# Patient Record
Sex: MALE | Race: WHITE | NOT HISPANIC OR LATINO | Employment: FULL TIME | URBAN - METROPOLITAN AREA
[De-identification: names, ages, dates, MRNs, and addresses within clinical notes are randomized per-mention and may not be internally consistent; named-entity substitution may affect disease eponyms.]

---

## 2019-10-14 ENCOUNTER — MEDICAL CORRESPONDENCE (OUTPATIENT)
Dept: HEALTH INFORMATION MANAGEMENT | Facility: CLINIC | Age: 29
End: 2019-10-14

## 2019-10-16 ENCOUNTER — PATIENT OUTREACH (OUTPATIENT)
Dept: PLASTIC SURGERY | Facility: CLINIC | Age: 29
End: 2019-10-16

## 2019-10-16 DIAGNOSIS — F64.0 GENDER DYSPHORIA IN ADOLESCENT AND ADULT: Primary | ICD-10-CM

## 2019-10-16 NOTE — PROGRESS NOTES
[facility]:  Care Coordination Note     SITUATION   Patient (Jude, He/Him)  is a 28 year old who is receiving support for:  Consult For (Top Surgery) and Clinic Care Coordination - Initial ([facility] pt)  .    BACKGROUND     New pt to [facility] requesting top surgery with Dr. Aviles.     Pt has letter of support from Dimitry Arias.     ASSESSMENT     Surgery              [facility] Assessment  [facility] ([facility]) Enrollment: Enrolled(hormones started in 3/2017 with Dr. Quijano at [facility])  Patient has a therapist: Yes  Name of therapist: Dimitry Arias  Letter of support #1: Requested  Surgery being considered: Yes  Mastectomy: Yes    Pt has [employer] [place] through work.  Pt looked up policy but couldn't understand it.  Pt instructed to bring policy to consultation to review with [facility] care coordinator.       PLAN          Nursing Interventions:   [facility] program and services discussed with patient. [facility] referral placed. [facility] assessment completed. Process for accessing surgery discussed including: WPATH standards of care, Letters of support, PA insurance process, surgery scheduling, and approximate timeline. Pt questions answered. Registration completed & reviewed; scheduling process discussed & completed, as necessary.     More than 50% of the time was used to educate patient on medical & surgical process.     Follow-up plan:  Pt scheduled for top consult with Stefan on 8/4/20 1pm.        Jairo Castellanos

## 2019-10-25 ENCOUNTER — PATIENT OUTREACH (OUTPATIENT)
Dept: PLASTIC SURGERY | Facility: CLINIC | Age: 29
End: 2019-10-25

## 2020-03-11 ENCOUNTER — HEALTH MAINTENANCE LETTER (OUTPATIENT)
Age: 30
End: 2020-03-11

## 2021-01-03 ENCOUNTER — HEALTH MAINTENANCE LETTER (OUTPATIENT)
Age: 31
End: 2021-01-03

## 2021-04-25 ENCOUNTER — HEALTH MAINTENANCE LETTER (OUTPATIENT)
Age: 31
End: 2021-04-25

## 2021-10-10 ENCOUNTER — HEALTH MAINTENANCE LETTER (OUTPATIENT)
Age: 31
End: 2021-10-10

## 2022-03-10 NOTE — PROGRESS NOTES
[facility]:  Care Coordination Note     SITUATION   Patient (Jude, He/Him)  is a 28 year old who is receiving support for:  Clinic Care Coordination - Follow-up (Letter of support )  .    BACKGROUND     Received letter of support which is adequate for PA.    Pt scheduled with Stefan on 8/2020    ASSESSMENT     Surgery              [facility] Assessment  [facility] ([facility] Enrollment: Enrolled(hormones started in 3/2017 with Dr. Quijano at [facility])  Patient has a therapist: Yes  Name of therapist: Dimitry Arias  Letter of support #1: Received  Letter #1 Date: 10/24/19  Surgery being considered: Yes  Mastectomy: Yes          PLAN          Nursing Interventions:   Reviewed letter for WPATH standards which is adequate.     Follow-up plan:  Pt to attend consult 8/2020.       Jairo Castellanos   Please download the patients records pertaining to Rheumatoid arthritis and forward to referral department

## 2022-05-22 ENCOUNTER — HEALTH MAINTENANCE LETTER (OUTPATIENT)
Age: 32
End: 2022-05-22

## 2022-09-18 ENCOUNTER — HEALTH MAINTENANCE LETTER (OUTPATIENT)
Age: 32
End: 2022-09-18

## 2023-06-04 ENCOUNTER — HEALTH MAINTENANCE LETTER (OUTPATIENT)
Age: 33
End: 2023-06-04